# Patient Record
Sex: MALE | HISPANIC OR LATINO | Employment: FULL TIME | URBAN - NONMETROPOLITAN AREA
[De-identification: names, ages, dates, MRNs, and addresses within clinical notes are randomized per-mention and may not be internally consistent; named-entity substitution may affect disease eponyms.]

---

## 2023-09-26 ENCOUNTER — OFFICE VISIT (OUTPATIENT)
Dept: URGENT CARE | Facility: CLINIC | Age: 33
End: 2023-09-26
Payer: COMMERCIAL

## 2023-09-26 VITALS
DIASTOLIC BLOOD PRESSURE: 60 MMHG | TEMPERATURE: 98 F | HEART RATE: 80 BPM | OXYGEN SATURATION: 98 % | RESPIRATION RATE: 16 BRPM | SYSTOLIC BLOOD PRESSURE: 118 MMHG

## 2023-09-26 DIAGNOSIS — L02.419 AXILLARY ABSCESS: Primary | ICD-10-CM

## 2023-09-26 PROCEDURE — 87070 CULTURE OTHR SPECIMN AEROBIC: CPT

## 2023-09-26 PROCEDURE — 10060 I&D ABSCESS SIMPLE/SINGLE: CPT

## 2023-09-26 PROCEDURE — 87205 SMEAR GRAM STAIN: CPT

## 2023-09-26 PROCEDURE — 99213 OFFICE O/P EST LOW 20 MIN: CPT

## 2023-09-26 RX ORDER — SULFAMETHOXAZOLE AND TRIMETHOPRIM 800; 160 MG/1; MG/1
1 TABLET ORAL EVERY 12 HOURS SCHEDULED
Qty: 14 TABLET | Refills: 0 | Status: SHIPPED | OUTPATIENT
Start: 2023-09-26 | End: 2023-10-03

## 2023-09-26 NOTE — PATIENT INSTRUCTIONS
Take prescribed antibiotic as instructed. Return in 1 to 2 days for removal of packing. Tylenol or ibuprofen as needed for pain or fever. Warm compresses 4-5 times daily to the affected area with a clean washcloth. Topical triple antibiotic ointment 1-2 times daily. If any symptoms worsen-go to the emergency room immediately. Follow up with PCP in 3-5 days. Proceed to  ER if symptoms worsen. Abscess   WHAT YOU NEED TO KNOW:   A warm compress may help your abscess drain. Your healthcare provider may make a cut in the abscess so it can drain. You may need surgery to remove an abscess that is on your hands or buttocks. DISCHARGE INSTRUCTIONS:   Return to the emergency department if:   The area around your abscess becomes very painful, warm, or has red streaks. You have a fever and chills. Your heart is beating faster than usual.    You feel faint or confused. Call your doctor if:   Your abscess gets bigger or does not get better. Your abscess returns. You have questions or concerns about your condition or care. Medicines: You may  need any of the following:  Antibiotics  help treat a bacterial infection. Acetaminophen  decreases pain and fever. It is available without a doctor's order. Ask how much to take and how often to take it. Follow directions. Read the labels of all other medicines you are using to see if they also contain acetaminophen, or ask your doctor or pharmacist. Acetaminophen can cause liver damage if not taken correctly. NSAIDs , such as ibuprofen, help decrease swelling, pain, and fever. This medicine is available with or without a doctor's order. NSAIDs can cause stomach bleeding or kidney problems in certain people. If you take blood thinner medicine, always ask your healthcare provider if NSAIDs are safe for you. Always read the medicine label and follow directions. Take your medicine as directed.   Contact your healthcare provider if you think your medicine is not helping or if you have side effects. Tell your provider if you are allergic to any medicine. Keep a list of the medicines, vitamins, and herbs you take. Include the amounts, and when and why you take them. Bring the list or the pill bottles to follow-up visits. Carry your medicine list with you in case of an emergency. Self-care:   Apply a warm compress to your abscess. This will help it open and drain. Wet a washcloth in warm, but not hot, water. Apply the compress for 10 minutes. Repeat this 4 times each day. Do not  press on an abscess or try to open it with a needle. You may push the bacteria deeper or into your blood. Do not share your clothes, towels, or sheets with anyone. This can spread the infection to others. Wash your hands often. This can help prevent the spread of germs. Use soap and water or an alcohol-based hand rub. Care for your wound after it is drained:   Care for your wound as directed. If your healthcare provider says it is okay, carefully remove the bandage and gauze packing. You may need to soak the gauze to get it out of your wound. Clean your wound and the area around it as directed. Dry the area and put on new, clean bandages. Change your bandages when they get wet or dirty. Ask your healthcare provider how to change the gauze in your wound. Keep track of how many pieces of gauze are placed inside the wound. Do not put too much packing in the wound. Do not pack the gauze too tightly in your wound. Follow up with your healthcare provider in 1 to 3 days: You may need to have your packing removed or your bandage changed. Write down your questions so you remember to ask them during your visits. © Copyright [facility] 2023 Information is for End User's use only and may not be sold, redistributed or otherwise used for commercial purposes. The above information is an  only.  It is not intended as medical advice for individual conditions or treatments. Talk to your doctor, nurse or pharmacist before following any medical regimen to see if it is safe and effective for you.

## 2023-09-26 NOTE — PROGRESS NOTES
[facility]        NAME: Teresa Mariano is a 26 y.o. male  : 1990    MRN: [id number]  DATE: 2023  TIME: 12:42 PM    Assessment and Plan   Axillary abscess [L02.419]  1. Axillary abscess  sulfamethoxazole-trimethoprim (BACTRIM DS) 800-160 mg per tablet    Wound culture and Gram stain        Moderately sized abscess to the right axillary region. History of these in the past 4-5 times. PT states it popped yesterday. Taking antibiotic since Thursday of last week. Incision and drainage performed-2 small 1 cm or less incisions in the right axillary region-bloody discharge without pus or abscess wall removed. Packing placed in one of the incisions. Starting on Bactrim. Advised to return in the next 24 to 48 hours for removal of packing. Advised to go to the ER if symptoms worsen. Patient Instructions     Take prescribed antibiotic as instructed. Return in 1 to 2 days for removal of packing. Tylenol or ibuprofen as needed for pain or fever. Warm compresses 4-5 times daily to the affected area with a clean washcloth. Topical triple antibiotic ointment 1-2 times daily. If any symptoms worsen-go to the emergency room immediately. Follow up with PCP in 3-5 days. Proceed to  ER if symptoms worsen. Chief Complaint     Chief Complaint   Patient presents with   • abcess     Started a few days ago  Right axillary abscess, reddened area         History of Present Illness       25-year-old male presents to the clinic for a abscess in the right axillary region that began 8 days ago. Patient states he has had these multiple times in the past, last episode was about 4 to 5 years ago. He has had these drained in the past.  PT states he is currently taking an antibiotic, unsure of where he got it from -may be prior prescription. PT states that it popped yesterday and drained a lot. He admits to some fever the other day.   Denies any chills, chest pain, shortness of breath, Orestes pain, nausea, vomiting, diarrhea. Review of Systems   Review of Systems   Constitutional: Positive for fatigue. HENT: Negative. Respiratory: Negative. Cardiovascular: Negative. Gastrointestinal: Negative. Skin: Positive for wound. Neurological: Negative. Current Medications       Current Outpatient Medications:   •  sulfamethoxazole-trimethoprim (BACTRIM DS) 800-160 mg per tablet, Take 1 tablet by mouth every 12 (twelve) hours for 7 days, Disp: 14 tablet, Rfl: 0    Current Allergies     Allergies as of 09/26/2023   • (No Known Allergies)            The following portions of the patient's history were reviewed and updated as appropriate: allergies, current medications, past family history, past medical history, past social history, past surgical history and problem list.     History reviewed. No pertinent past medical history. Past Surgical History:   Procedure Laterality Date   • BARIATRIC SURGERY         No family history on file. Medications have been verified. Objective   /60   Pulse 80   Temp 98 °F (36.7 °C)   Resp 16   SpO2 98%        Physical Exam     Physical Exam  Constitutional:       General: He is not in acute distress. Appearance: Normal appearance. He is not ill-appearing or diaphoretic. HENT:      Head: Normocephalic and atraumatic. Eyes:      Extraocular Movements: Extraocular movements intact. Pupils: Pupils are equal, round, and reactive to light. Cardiovascular:      Rate and Rhythm: Normal rate and regular rhythm. Pulses: Normal pulses. Heart sounds: Normal heart sounds. Pulmonary:      Effort: Pulmonary effort is normal.      Breath sounds: Normal breath sounds. Skin:     General: Skin is warm and dry. Capillary Refill: Capillary refill takes less than 2 seconds. Findings: Abscess (moderately sized abscess to righ axillary region. Mild scant discharge with palpation. Mild surrounding erythema.  Moderated tenderness to palpation) present. No rash. Neurological:      General: No focal deficit present. Mental Status: He is alert and oriented to person, place, and time. Mental status is at baseline. Psychiatric:         Mood and Affect: Mood normal.         Behavior: Behavior normal.         Incision and drain    Date/Time: 9/26/2023 11:30 AM    Performed by: Smita Baumann PA-C  Authorized by: Smita Baumann PA-C  Universal Protocol:  Consent: Verbal consent obtained. Risks and benefits: risks, benefits and alternatives were discussed  Consent given by: patient  Time out: Immediately prior to procedure a "time out" was called to verify the correct patient, procedure, equipment, support staff and site/side marked as required. Patient understanding: patient states understanding of the procedure being performed  Patient consent: the patient's understanding of the procedure matches consent given  Required items: required blood products, implants, devices, and special equipment available  Patient identity confirmed: verbally with patient      Patient location:  Clinic  Location:     Type:  Abscess    Location: right axillary. Pre-procedure details:     Skin preparation:  Betadine  Anesthesia (see MAR for exact dosages): Anesthesia method:  Local infiltration    Local anesthetic:  Lidocaine 1% w/o epi  Procedure details:     Complexity:  Simple    Needle aspiration: no      Incision types:  Single straight    Scalpel blade:  11    Approach:  Open    Incision depth:  Subcutaneous    Wound management:  Probed and deloculated and extensive cleaning    Drainage:  Bloody    Drainage amount:  Scant    Wound treatment:  Packing placed    Packing materials:  1/4 in gauze  Post-procedure details:     Patient tolerance of procedure: Tolerated well, no immediate complications  Comments:      2 small incisions made several inches apart. Bloody drainage from both. Follow-up for probed and deloculated. Packing left in one of the incisions that was more superior. Having patient return in 24 to 48 hours for removal/follow-up.

## 2023-09-27 ENCOUNTER — OFFICE VISIT (OUTPATIENT)
Dept: URGENT CARE | Facility: CLINIC | Age: 33
End: 2023-09-27
Payer: COMMERCIAL

## 2023-09-27 VITALS
SYSTOLIC BLOOD PRESSURE: 121 MMHG | RESPIRATION RATE: 18 BRPM | TEMPERATURE: 98 F | DIASTOLIC BLOOD PRESSURE: 86 MMHG | OXYGEN SATURATION: 99 % | HEART RATE: 79 BPM

## 2023-09-27 DIAGNOSIS — L73.2 HIDRADENITIS AXILLARIS: Primary | ICD-10-CM

## 2023-09-27 PROCEDURE — 99213 OFFICE O/P EST LOW 20 MIN: CPT | Performed by: PHYSICIAN ASSISTANT

## 2023-09-27 NOTE — PATIENT INSTRUCTIONS
Hidradenitis Suppurativa   WHAT YOU NEED TO KNOW:   Hidradenitis suppurativa (HS) is a chronic (long-term) skin disease that causes your sweat glands or hair follicles to get clogged and inflamed. HS causes red bumps that look like pimples or small boils to develop on your skin. The cause of HS is unknown. It may run in families. Being overweight and smoking worsens signs and symptoms of HS. DISCHARGE INSTRUCTIONS:   Contact your healthcare provider if:   Your symptoms get worse, even with treatment. You have questions or concerns about your condition or care. Medicines: You may need any of the following:  Antibiotics  are used to treat or prevent a bacterial infection. Antibiotics may be used long-term. Antibiotics may be given as a cream or pill. NSAIDs , such as ibuprofen, help decrease swelling, pain, and fever. This medicine is available with or without a doctor's order. NSAIDs can cause stomach bleeding or kidney problems in certain people. If you take blood thinner medicine, always ask your healthcare provider if NSAIDs are safe for you. Always read the medicine label and follow directions. Acetaminophen  decreases pain and fever. It is available without a doctor's order. Ask how much to take and how often to take it. Follow directions. Acetaminophen can cause liver damage if not taken correctly. Other medicines  may be used to treat HS. These may include hormones, acne medicines, steroids, biologic therapy, and medicines that slow your immune system. Take your medicine as directed. Contact your healthcare provider if you think your medicine is not helping or if you have side effects. Tell your provider if you are allergic to any medicine. Keep a list of the medicines, vitamins, and herbs you take. Include the amounts, and when and why you take them. Bring the list or the pill bottles to follow-up visits. Carry your medicine list with you in case of an emergency.     Manage HS symptoms and decrease flare-ups:   Apply warm, moist compresses. This may help to decrease pain. Keep the compress on your skin for 10 minutes. Sitz baths may be recommended if your genital or anal area is affected by HS. To do a sitz bath, fill a bathtub with 4 to 6 inches of warm water. You may also use a sitz bath pan that fits inside a toilet bowl. Sit in the sitz bath for 15 minutes. Do this 3 times a day, and after each bowel movement. The warm water can help decrease pain and swelling. Wash your skin gently. Use cleansers recommended by your healthcare provider. Antibacterial soap may be helpful. Lose weight if you are overweight. Weight loss may help to improve signs and symptoms of HS. Do not smoke. Smoking can make it more difficult to treat HS and worsen symptoms. Ask your healthcare provider for information if you currently smoke and need help to quit. E-cigarettes or smokeless tobacco still contain nicotine. Talk to your healthcare provider before you use these products. Do not wear tight clothing. Tight clothing rubs against your skin and causes irritation that can worsen HS. Do not shave or use deodorant in areas of skin affected by HS. Ask your healthcare provider about safe deodorant or hair removal options. Keep your skin cool. Overheating and sweating can cause an HS flare-up. Ask your healthcare provider if you should make any changes to the foods you eat. Your healthcare provider may recommend that you avoid dairy foods. A dairy-free diet may help decrease your symptoms. Dairy foods include milk, cheese, yogurt, and ice cream.     Tell your healthcare provider if HS is causing you to feel depressed. Your healthcare provider may recommend counseling to help you cope with HS. Follow up with your doctor as directed:  Write down your questions so you remember to ask them during your visits.   © Copyright [facility] 2023 Information is for End User's use only and may not be sold, redistributed or otherwise used for commercial purposes. The above information is an  only. It is not intended as medical advice for individual conditions or treatments. Talk to your doctor, nurse or pharmacist before following any medical regimen to see if it is safe and effective for you.

## 2023-09-27 NOTE — PROGRESS NOTES
[facility]        NAME: Jerrell Smith is a 26 y.o. male  : 1990    MRN: [id number]  DATE: 2023  TIME: 3:48 PM    Assessment and Plan   Hidradenitis axillaris [L73.2]  1. Hidradenitis axillaris              Patient Instructions   Patient Instructions   Hidradenitis Suppurativa   WHAT YOU NEED TO KNOW:   Hidradenitis suppurativa (HS) is a chronic (long-term) skin disease that causes your sweat glands or hair follicles to get clogged and inflamed. HS causes red bumps that look like pimples or small boils to develop on your skin. The cause of HS is unknown. It may run in families. Being overweight and smoking worsens signs and symptoms of HS. DISCHARGE INSTRUCTIONS:   Contact your healthcare provider if:   • Your symptoms get worse, even with treatment. • You have questions or concerns about your condition or care. Medicines: You may need any of the following:  • Antibiotics  are used to treat or prevent a bacterial infection. Antibiotics may be used long-term. Antibiotics may be given as a cream or pill. • NSAIDs , such as ibuprofen, help decrease swelling, pain, and fever. This medicine is available with or without a doctor's order. NSAIDs can cause stomach bleeding or kidney problems in certain people. If you take blood thinner medicine, always ask your healthcare provider if NSAIDs are safe for you. Always read the medicine label and follow directions. • Acetaminophen  decreases pain and fever. It is available without a doctor's order. Ask how much to take and how often to take it. Follow directions. Acetaminophen can cause liver damage if not taken correctly. • Other medicines  may be used to treat HS. These may include hormones, acne medicines, steroids, biologic therapy, and medicines that slow your immune system. • Take your medicine as directed. Contact your healthcare provider if you think your medicine is not helping or if you have side effects.  Tell your provider if you are allergic to any medicine. Keep a list of the medicines, vitamins, and herbs you take. Include the amounts, and when and why you take them. Bring the list or the pill bottles to follow-up visits. Carry your medicine list with you in case of an emergency. Manage HS symptoms and decrease flare-ups:   • Apply warm, moist compresses. This may help to decrease pain. Keep the compress on your skin for 10 minutes. Sitz baths may be recommended if your genital or anal area is affected by HS. To do a sitz bath, fill a bathtub with 4 to 6 inches of warm water. You may also use a sitz bath pan that fits inside a toilet bowl. Sit in the sitz bath for 15 minutes. Do this 3 times a day, and after each bowel movement. The warm water can help decrease pain and swelling. • Wash your skin gently. Use cleansers recommended by your healthcare provider. Antibacterial soap may be helpful. • Lose weight if you are overweight. Weight loss may help to improve signs and symptoms of HS. • Do not smoke. Smoking can make it more difficult to treat HS and worsen symptoms. Ask your healthcare provider for information if you currently smoke and need help to quit. E-cigarettes or smokeless tobacco still contain nicotine. Talk to your healthcare provider before you use these products. • Do not wear tight clothing. Tight clothing rubs against your skin and causes irritation that can worsen HS. • Do not shave or use deodorant in areas of skin affected by HS. Ask your healthcare provider about safe deodorant or hair removal options. • Keep your skin cool. Overheating and sweating can cause an HS flare-up. • Ask your healthcare provider if you should make any changes to the foods you eat. Your healthcare provider may recommend that you avoid dairy foods. A dairy-free diet may help decrease your symptoms.  Dairy foods include milk, cheese, yogurt, and ice cream.     • Tell your healthcare provider if HS is causing you to feel depressed. Your healthcare provider may recommend counseling to help you cope with HS. Follow up with your doctor as directed:  Write down your questions so you remember to ask them during your visits. © Copyright [employer] 2023 Information is for End User's use only and may not be sold, redistributed or otherwise used for commercial purposes. The above information is an  only. It is not intended as medical advice for individual conditions or treatments. Talk to your doctor, nurse or pharmacist before following any medical regimen to see if it is safe and effective for you. Follow up with PCP in 3-5 days. Proceed to  ER if symptoms worsen. Chief Complaint     Chief Complaint   Patient presents with   • Drainage from Incision     Here to have drain removed from  right axillary placed yesterday here          History of Present Illness       The patient presents to the clinic today for a wound check. He was seen in the clinic yesterday for possible hydradenitis in the right axilla area. He stated yesterday that he has a history of frequent infections in the axillary area that has required incision and drainage. The patient did report at the visit yesterday that it did drain itself but an I&D was performed yesterday and packing was placed. Who presents today to have the packing removed. He was placed on Bactrim. A wound culture was taken and preliminary results did not grow any bacteria. He currently denies fevers or chills. Review of Systems   Review of Systems   Skin: Positive for wound.         Wound in the right axilla as noted above         Current Medications       Current Outpatient Medications:   •  sulfamethoxazole-trimethoprim (BACTRIM DS) 800-160 mg per tablet, Take 1 tablet by mouth every 12 (twelve) hours for 7 days, Disp: 14 tablet, Rfl: 0    Current Allergies     Allergies as of 09/27/2023   • (No Known Allergies) The following portions of the patient's history were reviewed and updated as appropriate: allergies, current medications, past family history, past medical history, past social history, past surgical history and problem list.     History reviewed. No pertinent past medical history. Past Surgical History:   Procedure Laterality Date   • BARIATRIC SURGERY         History reviewed. No pertinent family history. Medications have been verified. Objective   /86   Pulse 79   Temp 98 °F (36.7 °C)   Resp 18   SpO2 99%        Physical Exam     Physical Exam  Skin:            Comments: - There is an open wound noted in the right axilla with packing in place. This was removed and some purulent drainage was expressed. The area is still slightly erythematous.    -There is tenderness to palpation.               -Patient currently declines repacking of the wound. I did make him aware that current data suggests that repacking the is not beneficial with kneeling and I suggest finishing the antibiotic as prescribed. I did suggest follow-up with dermatology if symptoms fail to improve. He is aware that hidradenitis can be a chronic condition and may require further treatment if the condition persists.

## 2023-09-29 LAB
BACTERIA WND AEROBE CULT: NO GROWTH
GRAM STN SPEC: NORMAL